# Patient Record
Sex: FEMALE | Race: ASIAN | NOT HISPANIC OR LATINO
[De-identification: names, ages, dates, MRNs, and addresses within clinical notes are randomized per-mention and may not be internally consistent; named-entity substitution may affect disease eponyms.]

---

## 2018-07-11 ENCOUNTER — APPOINTMENT (OUTPATIENT)
Dept: RADIOLOGY | Facility: CLINIC | Age: 63
End: 2018-07-11
Payer: COMMERCIAL

## 2018-07-11 ENCOUNTER — OUTPATIENT (OUTPATIENT)
Dept: OUTPATIENT SERVICES | Facility: HOSPITAL | Age: 63
LOS: 1 days | End: 2018-07-11
Payer: COMMERCIAL

## 2018-07-11 DIAGNOSIS — Z00.8 ENCOUNTER FOR OTHER GENERAL EXAMINATION: ICD-10-CM

## 2018-07-11 PROCEDURE — 73560 X-RAY EXAM OF KNEE 1 OR 2: CPT

## 2018-07-11 PROCEDURE — 73560 X-RAY EXAM OF KNEE 1 OR 2: CPT | Mod: 26,LT

## 2018-07-23 ENCOUNTER — EMERGENCY (EMERGENCY)
Facility: HOSPITAL | Age: 63
LOS: 0 days | Discharge: HOME | End: 2018-07-23
Attending: EMERGENCY MEDICINE | Admitting: EMERGENCY MEDICINE
Payer: COMMERCIAL

## 2018-07-23 VITALS — HEIGHT: 64 IN | WEIGHT: 134.92 LBS

## 2018-07-23 VITALS
HEART RATE: 63 BPM | OXYGEN SATURATION: 99 % | DIASTOLIC BLOOD PRESSURE: 63 MMHG | SYSTOLIC BLOOD PRESSURE: 136 MMHG | TEMPERATURE: 98 F | RESPIRATION RATE: 18 BRPM

## 2018-07-23 DIAGNOSIS — M25.562 PAIN IN LEFT KNEE: ICD-10-CM

## 2018-07-23 PROCEDURE — 73562 X-RAY EXAM OF KNEE 3: CPT | Mod: 26,LT

## 2018-07-23 PROCEDURE — 99283 EMERGENCY DEPT VISIT LOW MDM: CPT

## 2018-07-23 PROCEDURE — 72100 X-RAY EXAM L-S SPINE 2/3 VWS: CPT | Mod: 26

## 2018-07-23 RX ORDER — OXYCODONE AND ACETAMINOPHEN 5; 325 MG/1; MG/1
1 TABLET ORAL EVERY 6 HOURS
Qty: 0 | Refills: 0 | Status: DISCONTINUED | OUTPATIENT
Start: 2018-07-23 | End: 2018-07-23

## 2018-07-23 RX ORDER — OXYCODONE AND ACETAMINOPHEN 5; 325 MG/1; MG/1
1 TABLET ORAL ONCE
Qty: 0 | Refills: 0 | Status: DISCONTINUED | OUTPATIENT
Start: 2018-07-23 | End: 2018-07-23

## 2018-07-23 RX ADMIN — OXYCODONE AND ACETAMINOPHEN 1 TABLET(S): 5; 325 TABLET ORAL at 21:26

## 2018-07-23 NOTE — ED STATDOCS - MUSCULOSKELETAL, MLM
swelling to the medial side of the left knee, TTP medial aspect of L knee, mild TTP lumbar paraspinal, distal NVI, motor intact.

## 2018-07-23 NOTE — ED ADULT NURSE NOTE - OBJECTIVE STATEMENT
Pt presents to ER c/o left knee pain x 3 weeks. Pain has been becoming increasingly worse and radiates to lower back. Neuro intact, skin intake, denies numbness/tingling. +2 pulses in b/l lower extremity

## 2018-07-23 NOTE — ED STATDOCS - OBJECTIVE STATEMENT
Pt is a 62 y/o F, PMHx of hypothyroidism, presenting to the ED with three weeks history of left knee pain. Pt states that the pain has been constant, progressively worsening, and worse with weight bearing in the last two days. Pain now radiates upward to her lower back and she reports being unable to walk today. Denies fever, numbness/tingling/weakness, bladder/bowel dysfunction. Denies trauma. Pt states she saw her orthopedist regarding sxs, was given pain medications, but without relief in sxs.   Gurtowski- ortho

## 2018-07-23 NOTE — ED STATDOCS - PROGRESS NOTE DETAILS
64 y/o F presents with L knee pain. Pt has hx of chronic knee pain and is seeing ortho, she is taking meloxicam w/o relief. Denies new injury or trauma. No other complaints at this time  -Breezy Patel PA-C Results reviewed and discussed with pt. Recommend RICE therapy. Will dc with pain control SW given. She will FU with PMD and her orthopedic. Pt asked to return to ED immediately for any new or concerning symptoms or worsening symptoms. Pt acknowledges and understands plan.

## 2018-07-23 NOTE — ED STATDOCS - ATTENDING CONTRIBUTION TO CARE
I, Tatianna Fuentes MD,  performed the initial face to face bedside interview with this patient regarding history of present illness, review of symptoms and relevant past medical, social and family history.  I completed an independent physical examination.  I was the initial provider who evaluated this patient. I have signed out the follow up of any pending tests (i.e. labs, radiological studies) to the ACP.  I have communicated the patient’s plan of care and disposition with the ACP.  The history, relevant review of systems, past medical and surgical history, medical decision making, and physical examination was documented by the scribe in my presence and I attest to the accuracy of the documentation.

## 2018-07-25 DIAGNOSIS — Z98.890 OTHER SPECIFIED POSTPROCEDURAL STATES: Chronic | ICD-10-CM

## 2018-09-04 PROBLEM — E03.9 HYPOTHYROIDISM, UNSPECIFIED: Chronic | Status: ACTIVE | Noted: 2018-07-25

## 2018-12-06 ENCOUNTER — OUTPATIENT (OUTPATIENT)
Dept: OUTPATIENT SERVICES | Facility: HOSPITAL | Age: 63
LOS: 1 days | Discharge: HOME | End: 2018-12-06
Payer: MEDICARE

## 2018-12-06 VITALS
WEIGHT: 143.08 LBS | HEIGHT: 63 IN | SYSTOLIC BLOOD PRESSURE: 127 MMHG | DIASTOLIC BLOOD PRESSURE: 67 MMHG | HEART RATE: 62 BPM | RESPIRATION RATE: 18 BRPM | TEMPERATURE: 98 F | OXYGEN SATURATION: 99 %

## 2018-12-06 DIAGNOSIS — S83.242D OTHER TEAR OF MEDIAL MENISCUS, CURRENT INJURY, LEFT KNEE, SUBSEQUENT ENCOUNTER: ICD-10-CM

## 2018-12-06 DIAGNOSIS — Z98.891 HISTORY OF UTERINE SCAR FROM PREVIOUS SURGERY: Chronic | ICD-10-CM

## 2018-12-06 DIAGNOSIS — M17.12 UNILATERAL PRIMARY OSTEOARTHRITIS, LEFT KNEE: ICD-10-CM

## 2018-12-06 DIAGNOSIS — Z98.890 OTHER SPECIFIED POSTPROCEDURAL STATES: Chronic | ICD-10-CM

## 2018-12-06 LAB
ANION GAP SERPL CALC-SCNC: 9 MMOL/L — SIGNIFICANT CHANGE UP (ref 5–17)
APTT BLD: 36.1 SEC — SIGNIFICANT CHANGE UP (ref 27.5–36.3)
BASOPHILS # BLD AUTO: 0.03 K/UL — SIGNIFICANT CHANGE UP (ref 0–0.2)
BASOPHILS NFR BLD AUTO: 0.4 % — SIGNIFICANT CHANGE UP (ref 0–2)
BUN SERPL-MCNC: 14 MG/DL — SIGNIFICANT CHANGE UP (ref 7–23)
CALCIUM SERPL-MCNC: 8.8 MG/DL — SIGNIFICANT CHANGE UP (ref 8.5–10.1)
CHLORIDE SERPL-SCNC: 106 MMOL/L — SIGNIFICANT CHANGE UP (ref 96–108)
CO2 SERPL-SCNC: 25 MMOL/L — SIGNIFICANT CHANGE UP (ref 22–31)
CREAT SERPL-MCNC: 0.72 MG/DL — SIGNIFICANT CHANGE UP (ref 0.5–1.3)
EOSINOPHIL # BLD AUTO: 0.3 K/UL — SIGNIFICANT CHANGE UP (ref 0–0.5)
EOSINOPHIL NFR BLD AUTO: 3.5 % — SIGNIFICANT CHANGE UP (ref 0–6)
GLUCOSE SERPL-MCNC: 112 MG/DL — HIGH (ref 70–99)
HCT VFR BLD CALC: 39.5 % — SIGNIFICANT CHANGE UP (ref 34.5–45)
HGB BLD-MCNC: 13 G/DL — SIGNIFICANT CHANGE UP (ref 11.5–15.5)
IMM GRANULOCYTES NFR BLD AUTO: 0.2 % — SIGNIFICANT CHANGE UP (ref 0–1.5)
INR BLD: 1 RATIO — SIGNIFICANT CHANGE UP (ref 0.88–1.16)
LYMPHOCYTES # BLD AUTO: 4.13 K/UL — HIGH (ref 1–3.3)
LYMPHOCYTES # BLD AUTO: 48.4 % — HIGH (ref 13–44)
MCHC RBC-ENTMCNC: 28.6 PG — SIGNIFICANT CHANGE UP (ref 27–34)
MCHC RBC-ENTMCNC: 32.9 GM/DL — SIGNIFICANT CHANGE UP (ref 32–36)
MCV RBC AUTO: 87 FL — SIGNIFICANT CHANGE UP (ref 80–100)
MONOCYTES # BLD AUTO: 0.69 K/UL — SIGNIFICANT CHANGE UP (ref 0–0.9)
MONOCYTES NFR BLD AUTO: 8.1 % — SIGNIFICANT CHANGE UP (ref 2–14)
NEUTROPHILS # BLD AUTO: 3.36 K/UL — SIGNIFICANT CHANGE UP (ref 1.8–7.4)
NEUTROPHILS NFR BLD AUTO: 39.4 % — LOW (ref 43–77)
NRBC # BLD: 0 /100 WBCS — SIGNIFICANT CHANGE UP (ref 0–0)
PLATELET # BLD AUTO: 325 K/UL — SIGNIFICANT CHANGE UP (ref 150–400)
POTASSIUM SERPL-MCNC: 4 MMOL/L — SIGNIFICANT CHANGE UP (ref 3.5–5.3)
POTASSIUM SERPL-SCNC: 4 MMOL/L — SIGNIFICANT CHANGE UP (ref 3.5–5.3)
PROTHROM AB SERPL-ACNC: 11.1 SEC — SIGNIFICANT CHANGE UP (ref 10–12.9)
RBC # BLD: 4.54 M/UL — SIGNIFICANT CHANGE UP (ref 3.8–5.2)
RBC # FLD: 12 % — SIGNIFICANT CHANGE UP (ref 10.3–14.5)
SODIUM SERPL-SCNC: 140 MMOL/L — SIGNIFICANT CHANGE UP (ref 135–145)
WBC # BLD: 8.53 K/UL — SIGNIFICANT CHANGE UP (ref 3.8–10.5)
WBC # FLD AUTO: 8.53 K/UL — SIGNIFICANT CHANGE UP (ref 3.8–10.5)

## 2018-12-06 PROCEDURE — 93010 ELECTROCARDIOGRAM REPORT: CPT

## 2018-12-06 NOTE — H&P PST ADULT - NSANTHOSAYNRD_GEN_A_CORE
No. CHYNA screening performed.  STOP BANG Legend: 0-2 = LOW Risk; 3-4 = INTERMEDIATE Risk; 5-8 = HIGH Risk
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
## 2018-12-06 NOTE — H&P PST ADULT - FAMILY HISTORY
Mother  Still living? No  Family history of ovarian cancer, Age at diagnosis: Age Unknown  Family history of diabetes mellitus, Age at diagnosis: Age Unknown

## 2018-12-06 NOTE — H&P PST ADULT - PMH
Body aches    Gastroesophageal reflux disease, esophagitis presence not specified    Hyperlipidemia, unspecified hyperlipidemia type    Hypothyroidism    Osteopenia, unspecified location    Pain  left knee  Prediabetes    Primary osteoarthritis of left knee    Tear of meniscus of left knee as current injury, unspecified meniscus, unspecified tear type, initial encounter

## 2018-12-06 NOTE — H&P PST ADULT - HISTORY OF PRESENT ILLNESS
63 years old female with osteoarthritis and torn meniscus to left knee. Admits to constant pain that varies to left knee for 5 months. Admits to swelling to left knee. Denies buckling of pain. Ibuprofen for pain. Heat and cold compress applied at times. Planned arthroscopy of knee.

## 2018-12-06 NOTE — H&P PST ADULT - TEACHING/LEARNING LEARNING PREFERENCES
audio/computer/internet/group instruction/skill demonstration/individual instruction/pictorial/written material/video/verbal instruction

## 2018-12-06 NOTE — H&P PST ADULT - ASSESSMENT
63 years old female present to PST prior to left knee arthroscopy, meniscectomy and debridement with Dr. Debbie GERONIMO.  Plan   1. NPO after midnight  2. Take the following medications with sips of water on the day of procedure: Synthroid  3. Use E-Z sponge as directed  4. Drink a quart of extra  fluids the day before your surgery.  5. Medical clearance with Dr. Ferrara  6. CBC, BMP, PT/ INR and PTT sent to lab  7. EKG done

## 2018-12-07 PROBLEM — R52 PAIN, UNSPECIFIED: Chronic | Status: ACTIVE | Noted: 2018-12-06

## 2018-12-14 ENCOUNTER — RESULT REVIEW (OUTPATIENT)
Age: 63
End: 2018-12-14

## 2018-12-14 ENCOUNTER — OUTPATIENT (OUTPATIENT)
Dept: OUTPATIENT SERVICES | Facility: HOSPITAL | Age: 63
LOS: 1 days | Discharge: HOME | End: 2018-12-14
Payer: MEDICARE

## 2018-12-14 VITALS
WEIGHT: 143.08 LBS | HEIGHT: 63 IN | SYSTOLIC BLOOD PRESSURE: 139 MMHG | TEMPERATURE: 98 F | RESPIRATION RATE: 16 BRPM | HEART RATE: 63 BPM | OXYGEN SATURATION: 100 % | DIASTOLIC BLOOD PRESSURE: 77 MMHG

## 2018-12-14 VITALS
OXYGEN SATURATION: 99 % | TEMPERATURE: 98 F | HEART RATE: 67 BPM | RESPIRATION RATE: 16 BRPM | SYSTOLIC BLOOD PRESSURE: 132 MMHG | DIASTOLIC BLOOD PRESSURE: 84 MMHG

## 2018-12-14 DIAGNOSIS — Z98.890 OTHER SPECIFIED POSTPROCEDURAL STATES: Chronic | ICD-10-CM

## 2018-12-14 DIAGNOSIS — Z98.891 HISTORY OF UTERINE SCAR FROM PREVIOUS SURGERY: Chronic | ICD-10-CM

## 2018-12-14 PROCEDURE — 88304 TISSUE EXAM BY PATHOLOGIST: CPT | Mod: 26

## 2018-12-14 PROCEDURE — 93010 ELECTROCARDIOGRAM REPORT: CPT

## 2018-12-14 RX ORDER — PANTOPRAZOLE SODIUM 20 MG/1
40 TABLET, DELAYED RELEASE ORAL ONCE
Qty: 0 | Refills: 0 | Status: DISCONTINUED | OUTPATIENT
Start: 2018-12-14 | End: 2018-12-14

## 2018-12-14 RX ORDER — OXYCODONE HYDROCHLORIDE 5 MG/1
10 TABLET ORAL ONCE
Qty: 0 | Refills: 0 | Status: DISCONTINUED | OUTPATIENT
Start: 2018-12-14 | End: 2018-12-14

## 2018-12-14 RX ORDER — SODIUM CHLORIDE 9 MG/ML
1000 INJECTION, SOLUTION INTRAVENOUS
Qty: 0 | Refills: 0 | Status: DISCONTINUED | OUTPATIENT
Start: 2018-12-14 | End: 2018-12-14

## 2018-12-14 RX ORDER — MULTIVIT-MIN/FERROUS GLUCONATE 9 MG/15 ML
1 LIQUID (ML) ORAL
Qty: 0 | Refills: 0 | COMMUNITY

## 2018-12-14 RX ORDER — FENTANYL CITRATE 50 UG/ML
50 INJECTION INTRAVENOUS
Qty: 0 | Refills: 0 | Status: DISCONTINUED | OUTPATIENT
Start: 2018-12-14 | End: 2018-12-14

## 2018-12-14 RX ORDER — RANITIDINE HYDROCHLORIDE 150 MG/1
1 TABLET, FILM COATED ORAL
Qty: 0 | Refills: 0 | COMMUNITY

## 2018-12-14 RX ORDER — CALCIUM CARBONATE 500(1250)
1 TABLET ORAL
Qty: 0 | Refills: 0 | COMMUNITY

## 2018-12-14 RX ORDER — IBUPROFEN 200 MG
1 TABLET ORAL
Qty: 0 | Refills: 0 | COMMUNITY

## 2018-12-14 RX ORDER — LEVOTHYROXINE SODIUM 125 MCG
1 TABLET ORAL
Qty: 0 | Refills: 0 | COMMUNITY

## 2018-12-14 RX ORDER — OXYCODONE HYDROCHLORIDE 5 MG/1
5 TABLET ORAL ONCE
Qty: 0 | Refills: 0 | Status: DISCONTINUED | OUTPATIENT
Start: 2018-12-14 | End: 2018-12-14

## 2018-12-14 RX ORDER — ONDANSETRON 8 MG/1
4 TABLET, FILM COATED ORAL ONCE
Qty: 0 | Refills: 0 | Status: COMPLETED | OUTPATIENT
Start: 2018-12-14 | End: 2018-12-14

## 2018-12-14 RX ORDER — FLUTICASONE PROPIONATE 50 MCG
1 SPRAY, SUSPENSION NASAL
Qty: 0 | Refills: 0 | COMMUNITY

## 2018-12-14 RX ORDER — CHOLECALCIFEROL (VITAMIN D3) 125 MCG
2 CAPSULE ORAL
Qty: 0 | Refills: 0 | COMMUNITY

## 2018-12-14 RX ORDER — MAGNESIUM HYDROXIDE 400 MG/1
1 TABLET, CHEWABLE ORAL
Qty: 0 | Refills: 0 | COMMUNITY

## 2018-12-14 RX ADMIN — OXYCODONE HYDROCHLORIDE 5 MILLIGRAM(S): 5 TABLET ORAL at 11:30

## 2018-12-14 RX ADMIN — OXYCODONE HYDROCHLORIDE 5 MILLIGRAM(S): 5 TABLET ORAL at 12:00

## 2018-12-14 RX ADMIN — ONDANSETRON 4 MILLIGRAM(S): 8 TABLET, FILM COATED ORAL at 12:00

## 2018-12-14 NOTE — ASU DISCHARGE PLAN (ADULT/PEDIATRIC). - SPECIAL INSTRUCTIONS
Follow up with Dr Lewis in 7-10 days. Call office for appointment. Take medications as prescribed by Dr Lewis. Weight bear as tolerated. Keep dressing clean, dry, and intact. Rest, ice, and elevate affected extremity. Can replace dressing in 72 hours. Place adhesive bandage over incision sites.

## 2018-12-14 NOTE — ASU PREOP CHECKLIST - HEIGHT IN FEET
We are committed to providing our patients with their test results in a timely manner. If you have access to [employer], you will receive your results within 5 to 7 days. If your results are normal, a member of our office may call you or you may receive a letter in the mail within 10 to 15 days of your testing. If your results have something additional to discuss, a member of our office will contact you by phone. If at any time you have questions related your results, please feel free to call our office at [phone]  
5

## 2018-12-14 NOTE — ASU PATIENT PROFILE, ADULT - TEACHING/LEARNING LEARNING PREFERENCES
pictorial/skill demonstration/computer/internet/group instruction/verbal instruction/audio/individual instruction/video/written material

## 2018-12-14 NOTE — ASU DISCHARGE PLAN (ADULT/PEDIATRIC). - MEDICATION SUMMARY - MEDICATIONS TO TAKE
I will START or STAY ON the medications listed below when I get home from the hospital:    ibuprofen 800 mg oral tablet  -- 1 tab(s) by mouth once a day (at bedtime)  -- Indication: For home med    magnesium hydroxide 400 mg oral tablet, chewable  -- 1 tab(s) by mouth once a day (at bedtime)  -- Indication: For home med    Caltrate 600 mg oral tablet  -- 1 tab(s) by mouth once a day  -- Indication: For home med    raNITIdine 150 mg oral capsule  -- 1 cap(s) by mouth 2 times a day  -- Indication: For home med    Flonase 50 mcg/inh nasal spray  -- 1 spray(s) into nose once a day, As Needed  -- Indication: For home med    Synthroid 125 mcg (0.125 mg) oral tablet  -- 1 tab(s) by mouth once a day (in the morning)  -- Indication: For home med    Centrum oral tablet  -- 1 tab(s) by mouth once a day  -- Indication: For home med    Vitamin D3 2000 intl units oral capsule  -- 2 cap(s) by mouth once a day  -- Indication: For home med

## 2018-12-14 NOTE — ASU DISCHARGE PLAN (ADULT/PEDIATRIC). - NURSING INSTRUCTIONS
Review home care information sheet Begin with liquids and light food ( tea, toast, Jello, soups). Advance to what you normally eat. Liquids should taken in adequate amounts today.Refer to multi color post op discharge instruction sheet     CALL the DOCTOR:    -Fever greater than  101F  - Signs  of infection such as : increase pain,swelling,redness,or a bad  smell coming from the wound.  -Excessive amount of bleeding.  - Any pain that appears to be getting worse.  - Vomiting  -  If you have  not urinated 8 hours after surgery or have any difficulty urinating.     A responsible adult should be with you for the rest of the day and night for your safety and to help you if you needed.    Review attached FACT SHEET if applicable. For any problems or concerns,contact your doctor. [facility] patients should call the [facility]. If you cannot reach the doctor or clinic, call [facility] Emergency Department at [phone] or go to your local Emergency Department.  A responsible adult should be with you for the rest of the day and night for your safety and to help you if you needed. Resume your medications as listed on the attached Medication Record.

## 2018-12-19 DIAGNOSIS — Z83.3 FAMILY HISTORY OF DIABETES MELLITUS: ICD-10-CM

## 2018-12-19 DIAGNOSIS — M23.204 DERANGEMENT OF UNSPECIFIED MEDIAL MENISCUS DUE TO OLD TEAR OR INJURY, LEFT KNEE: ICD-10-CM

## 2018-12-19 DIAGNOSIS — E78.5 HYPERLIPIDEMIA, UNSPECIFIED: ICD-10-CM

## 2018-12-19 DIAGNOSIS — M17.12 UNILATERAL PRIMARY OSTEOARTHRITIS, LEFT KNEE: ICD-10-CM

## 2018-12-19 DIAGNOSIS — Z80.41 FAMILY HISTORY OF MALIGNANT NEOPLASM OF OVARY: ICD-10-CM

## 2018-12-19 DIAGNOSIS — R73.03 PREDIABETES: ICD-10-CM

## 2018-12-19 DIAGNOSIS — K21.9 GASTRO-ESOPHAGEAL REFLUX DISEASE WITHOUT ESOPHAGITIS: ICD-10-CM

## 2018-12-19 DIAGNOSIS — M65.862 OTHER SYNOVITIS AND TENOSYNOVITIS, LEFT LOWER LEG: ICD-10-CM

## 2018-12-19 DIAGNOSIS — M23.222 DERANGEMENT OF POSTERIOR HORN OF MEDIAL MENISCUS DUE TO OLD TEAR OR INJURY, LEFT KNEE: ICD-10-CM

## 2018-12-19 DIAGNOSIS — M94.262 CHONDROMALACIA, LEFT KNEE: ICD-10-CM

## 2018-12-19 DIAGNOSIS — M85.80 OTHER SPECIFIED DISORDERS OF BONE DENSITY AND STRUCTURE, UNSPECIFIED SITE: ICD-10-CM

## 2018-12-19 DIAGNOSIS — E03.9 HYPOTHYROIDISM, UNSPECIFIED: ICD-10-CM

## 2018-12-19 LAB — SURGICAL PATHOLOGY FINAL REPORT - CH: SIGNIFICANT CHANGE UP

## 2019-02-28 NOTE — H&P PST ADULT - BREASTS
Procedure(s): LEFT 2,3,4, MIS WEIL OSTEOTOMIES. Anesthesia Post Evaluation Multimodal analgesia: multimodal analgesia used between 6 hours prior to anesthesia start to PACU discharge Patient location during evaluation: bedside Patient participation: complete - patient participated Level of consciousness: awake and alert Pain score: 1 Pain management: adequate Airway patency: patent Anesthetic complications: no 
Cardiovascular status: acceptable Respiratory status: acceptable Hydration status: acceptable Comments: Pt doing well. Ok to d/c home. Post anesthesia nausea and vomiting:  none Visit Vitals BP 97/52 Pulse (!) 51 Temp 36.4 °C (97.5 °F) Resp 16 Wt 62.6 kg (138 lb) SpO2 98% BMI 23.69 kg/m² No masses; no nipple discharge

## 2022-05-04 PROBLEM — M17.12 UNILATERAL PRIMARY OSTEOARTHRITIS, LEFT KNEE: Chronic | Status: ACTIVE | Noted: 2018-12-06

## 2022-05-04 PROBLEM — S83.207A UNSPECIFIED TEAR OF UNSPECIFIED MENISCUS, CURRENT INJURY, LEFT KNEE, INITIAL ENCOUNTER: Chronic | Status: ACTIVE | Noted: 2018-12-06

## 2022-05-04 PROBLEM — R73.03 PREDIABETES: Chronic | Status: ACTIVE | Noted: 2018-12-06

## 2022-05-04 PROBLEM — K21.9 GASTRO-ESOPHAGEAL REFLUX DISEASE WITHOUT ESOPHAGITIS: Chronic | Status: ACTIVE | Noted: 2018-12-06

## 2022-05-04 PROBLEM — M85.80 OTHER SPECIFIED DISORDERS OF BONE DENSITY AND STRUCTURE, UNSPECIFIED SITE: Chronic | Status: ACTIVE | Noted: 2018-12-06

## 2022-05-04 PROBLEM — E78.5 HYPERLIPIDEMIA, UNSPECIFIED: Chronic | Status: ACTIVE | Noted: 2018-12-06

## 2022-05-04 PROBLEM — R52 PAIN, UNSPECIFIED: Chronic | Status: ACTIVE | Noted: 2018-12-06

## 2022-05-16 ENCOUNTER — APPOINTMENT (OUTPATIENT)
Age: 67
End: 2022-05-16

## 2022-05-16 DIAGNOSIS — M17.0 BILATERAL PRIMARY OSTEOARTHRITIS OF KNEE: ICD-10-CM

## 2022-05-16 PROCEDURE — 73560 X-RAY EXAM OF KNEE 1 OR 2: CPT | Mod: 50

## 2022-05-16 PROCEDURE — 99203 OFFICE O/P NEW LOW 30 MIN: CPT

## 2022-05-17 DIAGNOSIS — Z87.19 PERSONAL HISTORY OF OTHER DISEASES OF THE DIGESTIVE SYSTEM: ICD-10-CM

## 2022-05-17 DIAGNOSIS — Z80.41 FAMILY HISTORY OF MALIGNANT NEOPLASM OF OVARY: ICD-10-CM

## 2022-05-17 DIAGNOSIS — Z86.39 PERSONAL HISTORY OF OTHER ENDOCRINE, NUTRITIONAL AND METABOLIC DISEASE: ICD-10-CM

## 2022-05-18 NOTE — HISTORY OF PRESENT ILLNESS
[de-identified] : The patient comes in today with complaints to her bilateral knees.  It has been going on for several months and it is getting a little worse recently. The patient states the pain is constant.  The patient describes the pain as dull and sharp.  The patient notes rest and ice makes her symptoms better, while walking and bending makes her symptoms worse.   [] : No

## 2022-05-18 NOTE — DISCUSSION/SUMMARY
[de-identified] : At this time, due to osteoarthritis of bilateral knees, I recommend a course of physical therapy and topical anti-inflammatory cream.  She will be reassessed in four to six weeks.

## 2022-05-18 NOTE — ADDENDUM
[FreeTextEntry1] : This note was written by Елена Glynn on 05/18/2022 acting as a scribe for ANETTE YEBOAH III, MD

## 2022-05-18 NOTE — PHYSICAL EXAM
[de-identified] : Right Knee: \par Range of Motion in Degrees	\par 	                  Claimant:	Normal:	\par Flexion Active	  135 	                135-degrees	\par Flexion Passive	  135	                135-degrees	\par Extension Active	  0-5	                0-5-degrees	\par Extension Passive	  0-5	                0-5-degrees	\par \par No weakness to flexion/extension.  No evidence of instability in the AP plane or varus or valgus stress.  Negative  Lachman.  Negative pivot shift.  Negative anterior drawer test.  Negative posterior drawer test.  Negative Yee.  Negative Apley grind.  No medial or lateral joint line tenderness.  Positive tenderness over the medial and lateral facet of the patella.  Positive patellofemoral crepitations.  No lateral tilting patella.  No patella apprehension.  Positive crepitation in the medial and lateral femoral condyle.  No proximal or distal swelling, edema or tenderness.  No gross motor or sensory deficits.  Mild intra-articular swelling.  2+ DP and PT pulses.  Mild varus deformity.  Skin is intact.  No rashes, scars or lesions. \par \par Left Knee: \par Range of Motion in Degrees	\par 	                  Claimant:	Normal:	\par Flexion Active	  135 	                135-degrees	\par Flexion Passive	  135	                135-degrees	\par Extension Active	  0-5	                0-5-degrees	\par Extension Passive	  0-5	                0-5-degrees	\par \par No weakness to flexion/extension.  No evidence of instability in the AP plane or varus or valgus stress.  Negative  Lachman.  Negative pivot shift.  Negative anterior drawer test.  Negative posterior drawer test.  Negative Yee.  Negative Apley grind.  No medial or lateral joint line tenderness.  Positive tenderness over the medial and lateral facet of the patella.  Positive patellofemoral crepitations.  No lateral tilting patella.  No patella apprehension.  Positive crepitation in the medial and lateral femoral condyle.  No proximal or distal swelling, edema or tenderness.  No gross motor or sensory deficits.  Mild intra-articular swelling.  2+ DP and PT pulses.  Mild varus deformity.  Skin is intact.  No rashes, scars or lesions. \par  [de-identified] : Ambulating with a slightly antalgic to antalgic gait.  Station:  Normal.  [de-identified] : Appearance:  Well-developed, well-nourished female in no acute distress.\par   [de-identified] : Radiographs, which were taken in the office today, one-two views of the right knee, one-two views of the left knee and one view AP Standing, show moderate to early severe degenerative changes.

## 2022-05-18 NOTE — REVIEW OF SYSTEMS
[Joint Pain] : joint pain [Joint Stiffness] : joint stiffness [Feeling Tired] : fatigue [Depression] : depression [Sleep Disturbances] : ~T sleep disturbances [Feeling Weak] : feeling weak [Muscle Weakness] : muscle weakness [Negative] : Heme/Lymph

## 2022-10-21 ENCOUNTER — NON-APPOINTMENT (OUTPATIENT)
Age: 67
End: 2022-10-21

## 2022-10-21 DIAGNOSIS — Z83.79 FAMILY HISTORY OF OTHER DISEASES OF THE DIGESTIVE SYSTEM: ICD-10-CM

## 2022-10-21 DIAGNOSIS — Z83.3 FAMILY HISTORY OF DIABETES MELLITUS: ICD-10-CM

## 2022-10-21 DIAGNOSIS — R73.03 PREDIABETES.: ICD-10-CM

## 2022-10-21 RX ORDER — LEVOTHYROXINE SODIUM 125 UG/1
125 TABLET ORAL DAILY
Refills: 0 | Status: ACTIVE | COMMUNITY

## 2022-10-21 RX ORDER — PANTOPRAZOLE SODIUM 40 MG/1
40 TABLET, DELAYED RELEASE ORAL
Refills: 0 | Status: ACTIVE | COMMUNITY

## 2022-11-09 NOTE — ASU PATIENT PROFILE, ADULT - ACCEPTABLE
0 Dorsal Nasal Flap Text: The defect edges were debeveled with a #15 scalpel blade.  Given the location of the defect and the proximity to free margins a dorsal nasal flap was deemed most appropriate.  Using a sterile surgical marker, an appropriate dorsal nasal flap was drawn around the defect.    The area thus outlined was incised deep to adipose tissue with a #15 scalpel blade.  The skin margins were undermined to an appropriate distance in all directions utilizing iris scissors.

## 2022-12-06 DIAGNOSIS — J32.0 CHRONIC MAXILLARY SINUSITIS: ICD-10-CM

## 2023-03-01 NOTE — ASU PATIENT PROFILE, ADULT - TEACHING/LEARNING FACTORS IMPACT ABILITY TO LEARN
You can access the FollowMyHealth Patient Portal offered by [facility] by registering at the following website: [email or web address]. By joining [employer]’s FollowMyHealth portal, you will also be able to view your health information using other applications (apps) compatible with our system.
none

## 2023-03-22 ENCOUNTER — APPOINTMENT (OUTPATIENT)
Dept: ORTHOPEDIC SURGERY | Facility: CLINIC | Age: 68
End: 2023-03-22
Payer: MEDICARE

## 2023-03-22 VITALS
HEART RATE: 78 BPM | DIASTOLIC BLOOD PRESSURE: 67 MMHG | BODY MASS INDEX: 23.9 KG/M2 | WEIGHT: 140 LBS | SYSTOLIC BLOOD PRESSURE: 108 MMHG | HEIGHT: 64 IN

## 2023-03-22 DIAGNOSIS — S83.207A UNSPECIFIED TEAR OF UNSPECIFIED MENISCUS, CURRENT INJURY, LEFT KNEE, INITIAL ENCOUNTER: ICD-10-CM

## 2023-03-22 DIAGNOSIS — M77.12 LATERAL EPICONDYLITIS, LEFT ELBOW: ICD-10-CM

## 2023-03-22 DIAGNOSIS — M17.12 UNILATERAL PRIMARY OSTEOARTHRITIS, LEFT KNEE: ICD-10-CM

## 2023-03-22 PROCEDURE — 99213 OFFICE O/P EST LOW 20 MIN: CPT

## 2023-03-22 PROCEDURE — 73070 X-RAY EXAM OF ELBOW: CPT | Mod: LT

## 2023-03-27 PROBLEM — M77.12 LEFT TENNIS ELBOW: Status: ACTIVE | Noted: 2023-03-22

## 2023-03-27 NOTE — PHYSICAL EXAM
[Normal] : Gait: normal [de-identified] : Right Elbow:\par Elbow: Range of Motion in Degrees\par 	                                          Claimant:	Normal:	\par Flexion (Active)	                          170	170	\par Flexion (Passive)	                          170	170	\par Extension(Active)	                           0	                 0	\par Extension (Passive)	           0	                 0	\par Pronation/Supination (Active)	           0-180	 0-180	\par Pronation/Supination (Passive)          0-180             0-180	\par \par No tenderness to palpation over the medial and lateral epicondyle.  No pain with resisted dorsi or palmar flexion with .  No tenderness over the distal biceps.  No tenderness over the olecranon.  No swelling over the olecranon.  No instability to varus or valgus stress at 0, 30, 60 and 90 degrees.  No instability in the AP plane.  No motor or sensory deficits.  2+ radial and ulnar pulses.  Skin is intact.  No rashes, scars or lesions. \par \par Left Elbow:\par Elbow: Range of Motion in Degrees:\par 	                                               Claimant:	        Normal:	\par Flexion (Active)	                                 170	          170	\par Flexion (Passive)	                                 170	          170	\par Extension(Active)	                                   0	            0	\par Extension (Passive)	                   0	            0	\par Pronation/Supination (Active)	                0-180	          0-180	\par Pronation/Supination (Passive)	0-180	          0-180	\par             \par Point tenderness over the lateral epicondyle.  No tenderness to palpation over the medial epicondyle.  Pain with resisted dorsi and palmar flexion with .  No tenderness over the distal biceps.  No tenderness over the olecranon.  No swelling over the olecranon.  No instability to varus or valgus stress at 0, 30, 60 and 90 degrees.  No instability in the AP plane.  No motor or sensory deficits.   2+ radial and ulnar pulses.  Skin is intact.  No rashes, scars or lesions. \par  [de-identified] : Station:  Normal. [de-identified] : Appearance:  Well-developed, well-nourished female in no acute distress.\par   [de-identified] : Radiographs, two views of the left elbow taken in the office today, show no obvious osseous abnormalities.

## 2023-03-27 NOTE — HISTORY OF PRESENT ILLNESS
[de-identified] : The patient comes in today stating her knees are feeling better, but she is having complaints referable to her left elbow.

## 2023-03-27 NOTE — DISCUSSION/SUMMARY
[de-identified] : At this time, due to left tennis elbow, I recommend tennis elbow strapping, ice, anti-inflammatory and reassessment in 3-4 weeks.

## 2023-03-27 NOTE — ADDENDUM
[FreeTextEntry1] : This note was written by Salina Hodges on 03/27/2023 acting as scribe for Edison Lewis III, MD

## 2024-01-22 ENCOUNTER — APPOINTMENT (OUTPATIENT)
Dept: OTOLARYNGOLOGY | Facility: CLINIC | Age: 69
End: 2024-01-22
Payer: MEDICARE

## 2024-01-22 ENCOUNTER — NON-APPOINTMENT (OUTPATIENT)
Age: 69
End: 2024-01-22

## 2024-01-22 VITALS — HEIGHT: 64 IN | BODY MASS INDEX: 25.27 KG/M2 | WEIGHT: 148 LBS

## 2024-01-22 DIAGNOSIS — H90.3 SENSORINEURAL HEARING LOSS, BILATERAL: ICD-10-CM

## 2024-01-22 DIAGNOSIS — H69.93 UNSPECIFIED EUSTACHIAN TUBE DISORDER, BILATERAL: ICD-10-CM

## 2024-01-22 DIAGNOSIS — R09.82 POSTNASAL DRIP: ICD-10-CM

## 2024-01-22 DIAGNOSIS — H90.41 SENSORINEURAL HEARING LOSS, UNILATERAL, RIGHT EAR, WITH UNRESTRICTED HEARING ON THE CONTRALATERAL SIDE: ICD-10-CM

## 2024-01-22 DIAGNOSIS — H93.13 TINNITUS, BILATERAL: ICD-10-CM

## 2024-01-22 DIAGNOSIS — R51.9 HEADACHE, UNSPECIFIED: ICD-10-CM

## 2024-01-22 PROCEDURE — 99204 OFFICE O/P NEW MOD 45 MIN: CPT | Mod: 25

## 2024-01-22 PROCEDURE — 92557 COMPREHENSIVE HEARING TEST: CPT

## 2024-01-22 PROCEDURE — 31231 NASAL ENDOSCOPY DX: CPT

## 2024-01-22 PROCEDURE — 92567 TYMPANOMETRY: CPT

## 2024-01-22 NOTE — DATA REVIEWED
[de-identified] : Asymmetric SNHL right ear Moderate to moderately-severe SNHL right ear Mild to moderate SNHL left ear REC rule out retrocochlear pathology followed by [facility]

## 2024-01-22 NOTE — REASON FOR VISIT
[Initial Consultation] : an initial consultation for [Ear Pain] : ear pain [Tinnitus] : tinnitus [FreeTextEntry2] : ears

## 2024-01-22 NOTE — REVIEW OF SYSTEMS
[Ear Pain] : ear pain [Ear Itch] : ear itch [Hearing Loss] : hearing loss [Ear Noises] : ear noises [Cough] : cough [Negative] : Respiratory [de-identified] : clogged [FreeTextEntry1] : headaches    jaw pain

## 2024-01-22 NOTE — PROCEDURE
[FreeTextEntry6] : Indication:  Unable to adequately examine nasal passages and sinus drainage with anterior rhinoscopy. The patient has excessive nasal drip  Scope # 44 Mild septal deviation is present on direct visualization on either side. Both inferior nasal turbinates are moderate in size with normal  appearing mucosa.  The sinus endoscope was introduced into the right nares exam right middle meatus reveals no mucopus, polyps or inflammation.  The middle turbinate is unremarkable. The scope was advanced and the sphenoethmoid region was inspected. The superior meatus and nasal vault are unremarkable.  The nasopharynx is unremarkable without inflammation or mass The sinus endoscope was introduced into the left nares exam of the left middle meatus reveals no mucopus, polyps or inflammation and the left middle turbinate is unremarkable. The scope was advanced and the sphenoethmoid region was inspected. The left superior meatus and nasal vault are unremarkable. The fiberoptic scope was introduced to the posterior pharynx and exam of the endolarynx is wnl w good vocal cord mobility. No lesion noted in hypopharynx.  There is no erythema or edema of the posterior larynx to suggest reflux.

## 2024-01-22 NOTE — HISTORY OF PRESENT ILLNESS
[de-identified] : longstanding ear plugging ear pain deep inside co noise ears bilateral, co hearng loss co jaw pain, co cough hx reflux protonix but not dc by gi co nasal drip and using fluticasone may be helping neg hx allergies, no sneezing

## 2024-01-22 NOTE — ASSESSMENT
[FreeTextEntry1] : longstanding otalgia exam ears neg cough-l?laryngeal reflux ?tmj audio asymmetric loss ad c tymp ad tinnitus pnd eust tube dys trial ipratropium.

## 2024-01-23 RX ORDER — IPRATROPIUM BROMIDE 42 UG/1
0.06 SPRAY NASAL 3 TIMES DAILY
Qty: 1 | Refills: 6 | Status: ACTIVE | COMMUNITY
Start: 2024-01-22 | End: 1900-01-01

## 2024-01-31 PROBLEM — J32.0 CHRONIC MAXILLARY SINUSITIS: Status: ACTIVE | Noted: 2024-01-31

## 2024-01-31 RX ORDER — AMOXICILLIN AND CLAVULANATE POTASSIUM 875; 125 MG/1; MG/1
875-125 TABLET, COATED ORAL TWICE DAILY
Qty: 28 | Refills: 1 | Status: COMPLETED | COMMUNITY
Start: 2024-01-31 | End: 2024-02-28

## 2024-06-19 ENCOUNTER — APPOINTMENT (OUTPATIENT)
Dept: ORTHOPEDIC SURGERY | Facility: CLINIC | Age: 69
End: 2024-06-19

## 2024-06-19 VITALS
WEIGHT: 148 LBS | SYSTOLIC BLOOD PRESSURE: 115 MMHG | HEART RATE: 65 BPM | HEIGHT: 64 IN | BODY MASS INDEX: 25.27 KG/M2 | DIASTOLIC BLOOD PRESSURE: 68 MMHG

## 2024-06-19 DIAGNOSIS — M17.11 UNILATERAL PRIMARY OSTEOARTHRITIS, RIGHT KNEE: ICD-10-CM

## 2024-06-19 PROCEDURE — 99213 OFFICE O/P EST LOW 20 MIN: CPT | Mod: 25

## 2024-06-19 PROCEDURE — 73650 X-RAY EXAM OF HEEL: CPT | Mod: RT

## 2024-06-19 PROCEDURE — 20610 DRAIN/INJ JOINT/BURSA W/O US: CPT | Mod: RT

## 2024-06-24 PROBLEM — M17.11 PRIMARY OSTEOARTHRITIS OF RIGHT KNEE: Status: ACTIVE | Noted: 2024-06-20

## 2024-06-28 ENCOUNTER — APPOINTMENT (OUTPATIENT)
Dept: NEUROSURGERY | Facility: CLINIC | Age: 69
End: 2024-06-28
Payer: MEDICARE

## 2024-06-28 VITALS
HEART RATE: 58 BPM | WEIGHT: 140 LBS | TEMPERATURE: 97.9 F | DIASTOLIC BLOOD PRESSURE: 74 MMHG | OXYGEN SATURATION: 94 % | BODY MASS INDEX: 23.9 KG/M2 | HEIGHT: 64 IN | SYSTOLIC BLOOD PRESSURE: 127 MMHG | RESPIRATION RATE: 14 BRPM

## 2024-06-28 DIAGNOSIS — Z78.9 OTHER SPECIFIED HEALTH STATUS: ICD-10-CM

## 2024-06-28 DIAGNOSIS — M54.12 RADICULOPATHY, CERVICAL REGION: ICD-10-CM

## 2024-06-28 PROCEDURE — 99203 OFFICE O/P NEW LOW 30 MIN: CPT

## 2024-07-02 ENCOUNTER — NON-APPOINTMENT (OUTPATIENT)
Age: 69
End: 2024-07-02

## 2024-07-09 ENCOUNTER — APPOINTMENT (OUTPATIENT)
Dept: MRI IMAGING | Facility: CLINIC | Age: 69
End: 2024-07-09
Payer: MEDICARE

## 2024-07-09 ENCOUNTER — OUTPATIENT (OUTPATIENT)
Dept: OUTPATIENT SERVICES | Facility: HOSPITAL | Age: 69
LOS: 1 days | End: 2024-07-09
Payer: COMMERCIAL

## 2024-07-09 DIAGNOSIS — Z98.890 OTHER SPECIFIED POSTPROCEDURAL STATES: Chronic | ICD-10-CM

## 2024-07-09 DIAGNOSIS — Z98.891 HISTORY OF UTERINE SCAR FROM PREVIOUS SURGERY: Chronic | ICD-10-CM

## 2024-07-09 DIAGNOSIS — M54.12 RADICULOPATHY, CERVICAL REGION: ICD-10-CM

## 2024-07-09 PROCEDURE — 72141 MRI NECK SPINE W/O DYE: CPT

## 2024-07-09 PROCEDURE — 72141 MRI NECK SPINE W/O DYE: CPT | Mod: 26

## 2024-07-17 ENCOUNTER — APPOINTMENT (OUTPATIENT)
Dept: ORTHOPEDIC SURGERY | Facility: CLINIC | Age: 69
End: 2024-07-17

## 2024-07-17 VITALS
DIASTOLIC BLOOD PRESSURE: 69 MMHG | SYSTOLIC BLOOD PRESSURE: 114 MMHG | HEART RATE: 69 BPM | WEIGHT: 140 LBS | BODY MASS INDEX: 23.9 KG/M2 | HEIGHT: 64 IN

## 2024-07-17 DIAGNOSIS — M17.0 BILATERAL PRIMARY OSTEOARTHRITIS OF KNEE: ICD-10-CM

## 2024-07-17 PROCEDURE — 99213 OFFICE O/P EST LOW 20 MIN: CPT

## 2024-08-07 ENCOUNTER — APPOINTMENT (OUTPATIENT)
Dept: NEUROSURGERY | Facility: CLINIC | Age: 69
End: 2024-08-07

## 2024-08-07 PROBLEM — M47.22 CERVICAL SPONDYLOSIS WITH RADICULOPATHY: Status: ACTIVE | Noted: 2024-08-07

## 2024-08-07 PROBLEM — M50.10 CERVICAL DISC DISORDER WITH RADICULOPATHY: Status: ACTIVE | Noted: 2024-08-07

## 2024-08-07 PROCEDURE — 99204 OFFICE O/P NEW MOD 45 MIN: CPT

## 2024-08-08 NOTE — CONSULT LETTER
[Courtesy Letter:] : I had the pleasure of seeing your patient, [unfilled], in my office today. [Sincerely,] : Sincerely, [FreeTextEntry2] : Juan Hernandez MD [FreeTextEntry1] : Mrs. Garber is a very pleasant 69-year-old female patient who was seen in our office today in follow-up to review imaging findings.  I am happy to report that the patient's symptoms have improved significantly since her last visit.  The patient was previously assessed by our nurse practitioner regarding a 3-week history of radiating right-sided upper extremity pain.  The patient's pain is still present continues to improve.  The patient's pain is currently rated at a 5/10 in severity and her episodes of pain have also decreased in frequency.  On examination, the patient is alert, oriented, and compliant with the exam.  The patient continues to demonstrate full strength in the upper extremities bilaterally.  There is no evidence of a Diana sign or ankle clonus.  The patient ambulates well.  The patient is accompanied with an MRI scan of the cervical spine dated July 9, 2024.  These images demonstrate moderate changes at C5-C7 causing cervical stenosis without evidence of cord compression or myelomalacia.  Foraminal stenosis is noted bilaterally at these levels possibly causing nerve root impingement.  Taken together, the patient has a clinical history and radiographic findings most consistent with a resolving cervical radiculopathy on the right.  The patient's cervical radiculopathy in turn, is likely secondary to the degenerative changes noted at C5-7.  I have explained to the patient that she is a surgical candidate for either an anterior posterior approach to decompressing the nerve roots to potentially help with her ongoing pain.  However, given the patient's good clinical trajectory thus far and already noted improvement, we have elected to adopt a wait-and-see approach prior to intervening further.  The patient has opted to follow-up with our office on a as needed basis depending on her symptom progression which I believe is very reasonable.   [FreeTextEntry3] : Niko Fuentes MD, PhD, FRCSC, FAANS Attending Neurosurgeon   of Neurosurgery [facility] at [facility] at [address] [place]  [address] [phone]

## 2024-08-08 NOTE — CONSULT LETTER
[Courtesy Letter:] : I had the pleasure of seeing your patient, [unfilled], in my office today. [Sincerely,] : Sincerely, [FreeTextEntry2] : Juan Hernandez MD [FreeTextEntry1] : Mrs. Garber is a very pleasant 69-year-old female patient who was seen in our office today in follow-up to review imaging findings.  I am happy to report that the patient's symptoms have improved significantly since her last visit.  The patient was previously assessed by our nurse practitioner regarding a 3-week history of radiating right-sided upper extremity pain.  The patient's pain is still present continues to improve.  The patient's pain is currently rated at a 5/10 in severity and her episodes of pain have also decreased in frequency.  On examination, the patient is alert, oriented, and compliant with the exam.  The patient continues to demonstrate full strength in the upper extremities bilaterally.  There is no evidence of a Diana sign or ankle clonus.  The patient ambulates well.  The patient is accompanied with an MRI scan of the cervical spine dated July 9, 2024.  These images demonstrate moderate changes at C5-C7 causing cervical stenosis without evidence of cord compression or myelomalacia.  Foraminal stenosis is noted bilaterally at these levels possibly causing nerve root impingement.  Taken together, the patient has a clinical history and radiographic findings most consistent with a resolving cervical radiculopathy on the right.  The patient's cervical radiculopathy in turn, is likely secondary to the degenerative changes noted at C5-7.  I have explained to the patient that she is a surgical candidate for either an anterior posterior approach to decompressing the nerve roots to potentially help with her ongoing pain.  However, given the patient's good clinical trajectory thus far and already noted improvement, we have elected to adopt a wait-and-see approach prior to intervening further.  The patient has opted to follow-up with our office on a as needed basis depending on her symptom progression which I believe is very reasonable.   [FreeTextEntry3] : Niko Fuentes MD, PhD, FRCSC, FAANS Attending Neurosurgeon   of Neurosurgery [facility] at [facility] at [address] [place]  [address] [phone]

## 2024-08-08 NOTE — REVIEW OF SYSTEMS
[Numbness] : numbness [Tingling] : tingling [Negative] : Heme/Lymph [Hand Weakness] : no hand weakness

## 2024-08-08 NOTE — REASON FOR VISIT
[Follow-Up: _____] : a [unfilled] follow-up visit [Other: _____] : [unfilled] [FreeTextEntry1] : Right sided cervical radiculopathy

## 2024-10-23 ENCOUNTER — APPOINTMENT (OUTPATIENT)
Dept: OTOLARYNGOLOGY | Facility: CLINIC | Age: 69
End: 2024-10-23
Payer: MEDICARE

## 2024-10-23 ENCOUNTER — NON-APPOINTMENT (OUTPATIENT)
Age: 69
End: 2024-10-23

## 2024-10-23 VITALS — HEIGHT: 64 IN | WEIGHT: 140 LBS | BODY MASS INDEX: 23.9 KG/M2

## 2024-10-23 DIAGNOSIS — H90.41 SENSORINEURAL HEARING LOSS, UNILATERAL, RIGHT EAR, WITH UNRESTRICTED HEARING ON THE CONTRALATERAL SIDE: ICD-10-CM

## 2024-10-23 DIAGNOSIS — R09.82 POSTNASAL DRIP: ICD-10-CM

## 2024-10-23 DIAGNOSIS — H93.13 TINNITUS, BILATERAL: ICD-10-CM

## 2024-10-23 PROCEDURE — 92567 TYMPANOMETRY: CPT

## 2024-10-23 PROCEDURE — 99214 OFFICE O/P EST MOD 30 MIN: CPT

## 2024-10-23 PROCEDURE — 92557 COMPREHENSIVE HEARING TEST: CPT

## 2024-10-23 RX ORDER — PREDNISONE 10 MG/1
10 TABLET ORAL TWICE DAILY
Qty: 15 | Refills: 2 | Status: ACTIVE | COMMUNITY
Start: 2024-10-23 | End: 1900-01-01

## 2025-07-03 ENCOUNTER — APPOINTMENT (OUTPATIENT)
Dept: CARDIOLOGY | Facility: CLINIC | Age: 70
End: 2025-07-03
Payer: MEDICARE

## 2025-07-03 ENCOUNTER — NON-APPOINTMENT (OUTPATIENT)
Age: 70
End: 2025-07-03

## 2025-07-03 VITALS
HEART RATE: 63 BPM | BODY MASS INDEX: 23.32 KG/M2 | HEIGHT: 65 IN | OXYGEN SATURATION: 97 % | DIASTOLIC BLOOD PRESSURE: 79 MMHG | WEIGHT: 140 LBS | SYSTOLIC BLOOD PRESSURE: 126 MMHG

## 2025-07-03 PROBLEM — E78.5 DYSLIPIDEMIA: Status: ACTIVE | Noted: 2025-07-03

## 2025-07-03 PROBLEM — Z82.49 FAMILY HISTORY OF HEART ATTACK: Status: ACTIVE | Noted: 2025-07-03

## 2025-07-03 PROBLEM — E06.3 HYPOTHYROIDISM DUE TO HASHIMOTO THYROIDITIS: Status: ACTIVE | Noted: 2025-07-03

## 2025-07-03 PROBLEM — R00.2 PALPITATIONS: Status: ACTIVE | Noted: 2025-07-03

## 2025-07-03 PROBLEM — R07.2 PRECORDIAL PAIN: Status: ACTIVE | Noted: 2025-07-03

## 2025-07-03 PROCEDURE — 99204 OFFICE O/P NEW MOD 45 MIN: CPT

## 2025-07-03 PROCEDURE — 93000 ELECTROCARDIOGRAM COMPLETE: CPT

## 2025-07-03 RX ORDER — FAMOTIDINE 20 MG/1
20 TABLET, FILM COATED ORAL
Qty: 30 | Refills: 1 | Status: ACTIVE | COMMUNITY

## 2025-07-03 RX ORDER — ROSUVASTATIN CALCIUM 10 MG/1
10 TABLET, FILM COATED ORAL
Qty: 90 | Refills: 1 | Status: ACTIVE | COMMUNITY
Start: 2025-07-03 | End: 1900-01-01

## 2025-08-18 ENCOUNTER — APPOINTMENT (OUTPATIENT)
Dept: CT IMAGING | Facility: CLINIC | Age: 70
End: 2025-08-18
Payer: MEDICARE

## 2025-08-18 ENCOUNTER — OUTPATIENT (OUTPATIENT)
Dept: OUTPATIENT SERVICES | Facility: HOSPITAL | Age: 70
LOS: 1 days | End: 2025-08-18
Payer: COMMERCIAL

## 2025-08-18 DIAGNOSIS — Z98.891 HISTORY OF UTERINE SCAR FROM PREVIOUS SURGERY: Chronic | ICD-10-CM

## 2025-08-18 DIAGNOSIS — Z00.8 ENCOUNTER FOR OTHER GENERAL EXAMINATION: ICD-10-CM

## 2025-08-18 DIAGNOSIS — R07.2 PRECORDIAL PAIN: ICD-10-CM

## 2025-08-18 DIAGNOSIS — Z98.890 OTHER SPECIFIED POSTPROCEDURAL STATES: Chronic | ICD-10-CM

## 2025-08-18 PROCEDURE — 75574 CT ANGIO HRT W/3D IMAGE: CPT

## 2025-08-18 PROCEDURE — 75574 CT ANGIO HRT W/3D IMAGE: CPT | Mod: 26

## 2025-08-22 ENCOUNTER — OUTPATIENT (OUTPATIENT)
Dept: OUTPATIENT SERVICES | Facility: HOSPITAL | Age: 70
LOS: 1 days | End: 2025-08-22
Payer: COMMERCIAL

## 2025-08-22 DIAGNOSIS — Z98.890 OTHER SPECIFIED POSTPROCEDURAL STATES: Chronic | ICD-10-CM

## 2025-08-22 DIAGNOSIS — Z98.891 HISTORY OF UTERINE SCAR FROM PREVIOUS SURGERY: Chronic | ICD-10-CM

## 2025-08-22 DIAGNOSIS — Z00.00 ENCOUNTER FOR GENERAL ADULT MEDICAL EXAMINATION WITHOUT ABNORMAL FINDINGS: ICD-10-CM

## 2025-08-22 PROCEDURE — 75580 N-INVAS EST C FFR SW ALY CTA: CPT

## 2025-08-22 PROCEDURE — 75580 N-INVAS EST C FFR SW ALY CTA: CPT | Mod: 26

## 2025-09-02 ENCOUNTER — APPOINTMENT (OUTPATIENT)
Dept: CARDIOLOGY | Facility: CLINIC | Age: 70
End: 2025-09-02
Payer: MEDICARE

## 2025-09-02 VITALS
DIASTOLIC BLOOD PRESSURE: 76 MMHG | HEIGHT: 65 IN | WEIGHT: 140 LBS | BODY MASS INDEX: 23.32 KG/M2 | OXYGEN SATURATION: 97 % | HEART RATE: 69 BPM | SYSTOLIC BLOOD PRESSURE: 129 MMHG

## 2025-09-02 DIAGNOSIS — I25.118 ATHEROSCLEROTIC HEART DISEASE OF NATIVE CORONARY ARTERY WITH OTHER FORMS OF ANGINA PECTORIS: ICD-10-CM

## 2025-09-02 DIAGNOSIS — Z01.818 ENCOUNTER FOR OTHER PREPROCEDURAL EXAMINATION: ICD-10-CM

## 2025-09-02 PROCEDURE — G2211 COMPLEX E/M VISIT ADD ON: CPT

## 2025-09-02 PROCEDURE — 93000 ELECTROCARDIOGRAM COMPLETE: CPT

## 2025-09-02 PROCEDURE — 99205 OFFICE O/P NEW HI 60 MIN: CPT

## 2025-09-11 LAB
HCT VFR BLD CALC: 38.6 %
HGB BLD-MCNC: 12.2 G/DL
MCHC RBC-ENTMCNC: 27.4 PG
MCHC RBC-ENTMCNC: 31.6 G/DL
MCV RBC AUTO: 86.7 FL
PLATELET # BLD AUTO: 311 K/UL
RBC # BLD: 4.45 M/UL
RBC # FLD: 13.4 %
WBC # FLD AUTO: 9.77 K/UL

## 2025-09-13 PROBLEM — S83.207A UNSPECIFIED TEAR OF UNSPECIFIED MENISCUS, CURRENT INJURY, LEFT KNEE, INITIAL ENCOUNTER: Chronic | Status: INACTIVE | Noted: 2018-12-06 | Resolved: 2025-09-12

## 2025-09-15 ENCOUNTER — TRANSCRIPTION ENCOUNTER (OUTPATIENT)
Age: 70
End: 2025-09-15

## 2025-09-19 ENCOUNTER — APPOINTMENT (OUTPATIENT)
Dept: CARDIOLOGY | Facility: CLINIC | Age: 70
End: 2025-09-19
Payer: MEDICARE

## 2025-09-19 VITALS
WEIGHT: 135 LBS | SYSTOLIC BLOOD PRESSURE: 137 MMHG | DIASTOLIC BLOOD PRESSURE: 73 MMHG | OXYGEN SATURATION: 97 % | BODY MASS INDEX: 22.49 KG/M2 | HEART RATE: 68 BPM | HEIGHT: 65 IN

## 2025-09-19 DIAGNOSIS — E06.3 AUTOIMMUNE THYROIDITIS: ICD-10-CM

## 2025-09-19 DIAGNOSIS — R07.2 PRECORDIAL PAIN: ICD-10-CM

## 2025-09-19 DIAGNOSIS — Z95.5 PRESENCE OF CORONARY ANGIOPLASTY IMPLANT AND GRAFT: ICD-10-CM

## 2025-09-19 DIAGNOSIS — E78.5 HYPERLIPIDEMIA, UNSPECIFIED: ICD-10-CM

## 2025-09-19 PROCEDURE — 99213 OFFICE O/P EST LOW 20 MIN: CPT

## 2025-09-19 PROCEDURE — 93000 ELECTROCARDIOGRAM COMPLETE: CPT

## 2025-09-19 RX ORDER — METOPROLOL SUCCINATE 25 MG/1
25 TABLET, EXTENDED RELEASE ORAL
Qty: 30 | Refills: 0 | Status: ACTIVE | COMMUNITY
Start: 2025-09-19

## 2025-09-19 RX ORDER — ROSUVASTATIN CALCIUM 20 MG/1
20 TABLET, FILM COATED ORAL DAILY
Refills: 0 | Status: ACTIVE | COMMUNITY

## 2025-09-19 RX ORDER — ASPIRIN 81 MG/1
81 TABLET, COATED ORAL
Refills: 0 | Status: ACTIVE | COMMUNITY

## 2025-09-19 RX ORDER — METOPROLOL TARTRATE 25 MG/1
25 TABLET ORAL DAILY
Refills: 0 | Status: DISCONTINUED | COMMUNITY
End: 2025-09-19

## 2025-09-19 RX ORDER — LEVOTHYROXINE SODIUM 0.12 MG/1
125 TABLET ORAL DAILY
Refills: 0 | Status: ACTIVE | COMMUNITY

## 2025-09-19 RX ORDER — CLOPIDOGREL BISULFATE 75 MG/1
75 TABLET, FILM COATED ORAL
Qty: 90 | Refills: 3 | Status: ACTIVE | COMMUNITY